# Patient Record
Sex: FEMALE | Race: BLACK OR AFRICAN AMERICAN | Employment: OTHER | URBAN - METROPOLITAN AREA
[De-identification: names, ages, dates, MRNs, and addresses within clinical notes are randomized per-mention and may not be internally consistent; named-entity substitution may affect disease eponyms.]

---

## 2020-12-15 VITALS
TEMPERATURE: 98.3 F | SYSTOLIC BLOOD PRESSURE: 134 MMHG | BODY MASS INDEX: 36.32 KG/M2 | WEIGHT: 218 LBS | HEART RATE: 57 BPM | DIASTOLIC BLOOD PRESSURE: 54 MMHG | OXYGEN SATURATION: 100 % | HEIGHT: 65 IN

## 2020-12-15 DIAGNOSIS — G35 MULTIPLE SCLEROSIS (HCC): Primary | ICD-10-CM

## 2020-12-15 PROBLEM — E66.9 OBESITY: Status: ACTIVE | Noted: 2020-12-15

## 2020-12-15 PROBLEM — I70.209 ATHEROSCLEROSIS OF ARTERIES OF EXTREMITIES (HCC): Status: ACTIVE | Noted: 2020-12-15

## 2020-12-15 PROBLEM — I83.90 VARICOSE VEINS OF LOWER EXTREMITY: Status: ACTIVE | Noted: 2020-12-15

## 2020-12-15 PROBLEM — I73.9 PVD (PERIPHERAL VASCULAR DISEASE) (HCC): Status: ACTIVE | Noted: 2020-12-15

## 2020-12-15 PROBLEM — E03.9 ACQUIRED HYPOTHYROIDISM: Status: ACTIVE | Noted: 2020-12-15

## 2020-12-15 PROBLEM — K21.9 GERD (GASTROESOPHAGEAL REFLUX DISEASE): Status: ACTIVE | Noted: 2020-12-15

## 2020-12-15 PROBLEM — D72.819 LEUKOPENIA: Status: ACTIVE | Noted: 2020-12-15

## 2020-12-15 PROBLEM — I77.9 PERIPHERAL ARTERIAL OCCLUSIVE DISEASE (HCC): Status: ACTIVE | Noted: 2020-12-15

## 2020-12-15 PROBLEM — I10 HYPERTENSIVE DISORDER: Status: ACTIVE | Noted: 2020-12-15

## 2020-12-15 PROBLEM — E78.00 HYPERCHOLESTEROLEMIA: Status: ACTIVE | Noted: 2020-12-15

## 2020-12-15 RX ORDER — METOPROLOL SUCCINATE 25 MG/1
TABLET, EXTENDED RELEASE ORAL DAILY
COMMUNITY
End: 2021-01-15

## 2020-12-15 RX ORDER — LEVOTHYROXINE SODIUM 25 UG/1
25 TABLET ORAL
COMMUNITY

## 2020-12-15 RX ORDER — QUINIDINE GLUCONATE 324 MG
240 TABLET, EXTENDED RELEASE ORAL
COMMUNITY
End: 2021-01-15

## 2020-12-15 RX ORDER — BRIMONIDINE TARTRATE 2 MG/ML
1 SOLUTION/ DROPS OPHTHALMIC 2 TIMES DAILY
COMMUNITY

## 2020-12-15 RX ORDER — HYDROCHLOROTHIAZIDE 12.5 MG/1
12.5 CAPSULE ORAL DAILY
COMMUNITY
End: 2021-01-15

## 2020-12-15 RX ORDER — SIMVASTATIN 40 MG/1
TABLET, FILM COATED ORAL
COMMUNITY

## 2020-12-15 RX ORDER — INTERFERON BETA-1A 30 UG/.5ML
30 INJECTION, SOLUTION INTRAMUSCULAR
COMMUNITY

## 2020-12-15 RX ORDER — PREDNISOLONE ACETATE 10 MG/ML
1 SUSPENSION/ DROPS OPHTHALMIC 4 TIMES DAILY
COMMUNITY
End: 2021-01-15

## 2021-01-15 ENCOUNTER — OFFICE VISIT (OUTPATIENT)
Dept: SURGERY | Age: 81
End: 2021-01-15
Payer: MEDICARE

## 2021-01-15 VITALS
SYSTOLIC BLOOD PRESSURE: 126 MMHG | HEIGHT: 65 IN | WEIGHT: 216 LBS | HEART RATE: 61 BPM | OXYGEN SATURATION: 99 % | DIASTOLIC BLOOD PRESSURE: 43 MMHG | BODY MASS INDEX: 35.99 KG/M2 | TEMPERATURE: 95.7 F

## 2021-01-15 DIAGNOSIS — I83.209: ICD-10-CM

## 2021-01-15 DIAGNOSIS — I73.9 PVD (PERIPHERAL VASCULAR DISEASE) (HCC): Primary | ICD-10-CM

## 2021-01-15 DIAGNOSIS — L97.908: ICD-10-CM

## 2021-01-15 PROCEDURE — 99213 OFFICE O/P EST LOW 20 MIN: CPT | Performed by: SURGERY

## 2021-01-15 PROCEDURE — 1090F PRES/ABSN URINE INCON ASSESS: CPT | Performed by: SURGERY

## 2021-01-15 PROCEDURE — G8536 NO DOC ELDER MAL SCRN: HCPCS | Performed by: SURGERY

## 2021-01-15 PROCEDURE — G8752 SYS BP LESS 140: HCPCS | Performed by: SURGERY

## 2021-01-15 PROCEDURE — G8400 PT W/DXA NO RESULTS DOC: HCPCS | Performed by: SURGERY

## 2021-01-15 PROCEDURE — G8432 DEP SCR NOT DOC, RNG: HCPCS | Performed by: SURGERY

## 2021-01-15 PROCEDURE — G8754 DIAS BP LESS 90: HCPCS | Performed by: SURGERY

## 2021-01-15 PROCEDURE — 1101F PT FALLS ASSESS-DOCD LE1/YR: CPT | Performed by: SURGERY

## 2021-01-15 PROCEDURE — G8427 DOCREV CUR MEDS BY ELIG CLIN: HCPCS | Performed by: SURGERY

## 2021-01-15 PROCEDURE — G8417 CALC BMI ABV UP PARAM F/U: HCPCS | Performed by: SURGERY

## 2021-01-15 RX ORDER — LOSARTAN POTASSIUM 25 MG/1
25 TABLET ORAL DAILY
COMMUNITY

## 2021-01-15 RX ORDER — TORSEMIDE 10 MG/1
10 TABLET ORAL EVERY OTHER DAY
COMMUNITY

## 2021-01-16 NOTE — PROGRESS NOTES
VASCULAR FOLLOW UP      Subjective:   CHIEF COMPLAINTS:  Follow-up venous hypertension and right leg discomfort. PRESENTATION OF ILLNESS:  Ms. Laquita Bonilla is here for 6-month follow-up. Patient wearing compression stocking that was prescribed last time she was here. Patient is now wearing a compression stocking that she bought. Patient says she did not wear it today. Patient says swelling is gone down. Patient denies chest pain shortness of breath. Patient complaining of the right leg discomfort at the ankle area. This is new onset by with ambulation and activity. Patient denies any ulcers on the right foot. Patient denies abdominal pain weight loss. Past Medical History:   Diagnosis Date    Bradycardia     High cholesterol     Hypertension     Hypothyroidism     Leukopenia     Multiple sclerosis (HCC)     PVD (peripheral vascular disease) (HCC)       Past Surgical History:   Procedure Laterality Date    HX HYSTERECTOMY  1982     Family History   Problem Relation Age of Onset    Hypertension Mother     Cancer Father       Social History     Tobacco Use    Smoking status: Former Smoker     Packs/day: 0.25     Years: 13.00     Pack years: 3.25    Smokeless tobacco: Never Used   Substance Use Topics    Alcohol use: Not Currently       Prior to Admission medications    Medication Sig Start Date End Date Taking? Authorizing Provider   losartan (COZAAR) 25 mg tablet Take  by mouth daily. Provider, Historical   torsemide (DEMADEX) 10 mg tablet Take  by mouth daily. Provider, Historical   interferon beta-1a (Avonex) 30 mcg/0.5 mL injection 30 mcg by IntraMUSCular route once. Provider, Historical   brimonidine (ALPHAGAN) 0.2 % ophthalmic solution Administer 1 Drop to both eyes three (3) times daily. Provider, Historical   levothyroxine (SYNTHROID) 25 mcg tablet Take  by mouth Daily (before breakfast).     Provider, Historical   linaCLOtide (Linzess) 145 mcg cap capsule Take  by mouth Daily (before breakfast). Provider, Historical   simvastatin (ZOCOR) 40 mg tablet Take  by mouth nightly. Provider, Historical     No Known Allergies     Review of Systems:  I reviewed the rest of organ systems personally and they were negative signed by Dr. Kaushik Aceves    Objective:     Visit Vitals  BP (!) 126/43 (BP 1 Location: Left arm, BP Patient Position: Sitting)   Pulse 61   Temp (!) 95.7 °F (35.4 °C)   Ht 5' 5\" (1.651 m)   Wt 216 lb (98 kg)   SpO2 99%   BMI 35.94 kg/m²     VITAL SIGNS REVIEWED. Physical Exam:  Patient is well-nourished pleasant in conversation is appropriate. Head and neck examination atraumatic, normocephalic. Gaze appropriate. Conversation appropriate. Neck examination shows supple. No mass. No obvious carotid bruit. Chest examination shows lungs are clear bilaterally well-expanded, no crackles or wheezes. Cardiovascular system regular rate, no obvious murmur. Skin warm to touch  and moist, no skin lesions. Abdomen is soft ,not tender or distended bowel sounds present. No palpable mass. Neurological examinations, no focal neuro deficits moving all 4 extremities. Cranial nerves intact. Sensation is intact as well. Hematologic: No obvious bruise or swelling or obvious lymphadenopathy. Psychosocial: Appropriate. Has good effect. Musculoskeletal system: No muscle wasting, appropriate movements upper and lower extremity. Vascular examination: Patient swelling is improved. Venous stasis looks better. Patient does have nonpalpable dopplerable signal on the right dorsalis pedis but dopplerable monophasic signal on the right PT. Patient has a bilateral PT and DP signal on the left foot. Patient has nonpalpable pedal pulse. Data Review:   No visits with results within 1 Month(s) from this visit. Latest known visit with results is:   No results found for any previous visit.         Assessment:     Problem List Items Addressed This Visit        Circulatory    PVD [facility]peripheral vascular disease) ([facility] - Primary    Relevant Orders    LOWER EXT ART PVR MULT LEVEL SEG PRESSURES    Varicose veins of lower extremity              Plan:     On top of her chronic venous hypertension patient may be suffering from peripheral artery disease. Doppler interrogation shows significant discrepancy compared to the left side. I will order IZAIAH examination. Patient also works at [address].  Patient like to get the study done at the [address].  Patient was advised to wear compression stocking. I will see her back my office follow-up after examination done.         Raegan Rich MD

## 2021-01-26 ENCOUNTER — TELEPHONE (OUTPATIENT)
Dept: SURGERY | Age: 81
End: 2021-01-26

## 2021-01-26 NOTE — TELEPHONE ENCOUNTER
Patient call want to know when will she get schedule for a IZAIAH examination.  She would like to be schedule at [facility]. Thanks

## 2021-01-28 NOTE — TELEPHONE ENCOUNTER
Called to let her know we faxed orders over to [address] [place] [address]. Informed her the routine was for them to get the order, then call the pt themselves to give them an appointment. They will then fax us back with the date and time. Pt did not answer but left pt a detailed message.  Thank you

## 2021-09-17 ENCOUNTER — OFFICE VISIT (OUTPATIENT)
Dept: SURGERY | Age: 81
End: 2021-09-17
Payer: MEDICARE

## 2021-09-17 VITALS
DIASTOLIC BLOOD PRESSURE: 70 MMHG | RESPIRATION RATE: 14 BRPM | WEIGHT: 201.8 LBS | OXYGEN SATURATION: 98 % | SYSTOLIC BLOOD PRESSURE: 120 MMHG | BODY MASS INDEX: 33.62 KG/M2 | HEIGHT: 65 IN | HEART RATE: 60 BPM | TEMPERATURE: 96.5 F

## 2021-09-17 DIAGNOSIS — I83.209: ICD-10-CM

## 2021-09-17 DIAGNOSIS — L97.908: ICD-10-CM

## 2021-09-17 DIAGNOSIS — I70.209 ATHEROSCLEROSIS OF ARTERIES OF EXTREMITIES (HCC): Primary | ICD-10-CM

## 2021-09-17 PROCEDURE — G8754 DIAS BP LESS 90: HCPCS | Performed by: SURGERY

## 2021-09-17 PROCEDURE — 99213 OFFICE O/P EST LOW 20 MIN: CPT | Performed by: SURGERY

## 2021-09-17 PROCEDURE — G8510 SCR DEP NEG, NO PLAN REQD: HCPCS | Performed by: SURGERY

## 2021-09-17 PROCEDURE — G8417 CALC BMI ABV UP PARAM F/U: HCPCS | Performed by: SURGERY

## 2021-09-17 PROCEDURE — G8752 SYS BP LESS 140: HCPCS | Performed by: SURGERY

## 2021-09-17 PROCEDURE — G8427 DOCREV CUR MEDS BY ELIG CLIN: HCPCS | Performed by: SURGERY

## 2021-09-17 PROCEDURE — 1090F PRES/ABSN URINE INCON ASSESS: CPT | Performed by: SURGERY

## 2021-09-17 PROCEDURE — G8536 NO DOC ELDER MAL SCRN: HCPCS | Performed by: SURGERY

## 2021-09-17 PROCEDURE — G8400 PT W/DXA NO RESULTS DOC: HCPCS | Performed by: SURGERY

## 2021-09-17 PROCEDURE — 1101F PT FALLS ASSESS-DOCD LE1/YR: CPT | Performed by: SURGERY

## 2021-09-17 RX ORDER — ROSUVASTATIN CALCIUM 10 MG/1
10 TABLET, COATED ORAL DAILY
COMMUNITY
Start: 2021-07-13

## 2021-09-17 RX ORDER — TORSEMIDE 10 MG/1
TABLET ORAL
COMMUNITY
End: 2021-09-17 | Stop reason: SDUPTHER

## 2021-09-17 RX ORDER — CHOLECALCIFEROL (VITAMIN D3) 125 MCG
CAPSULE ORAL DAILY
COMMUNITY

## 2021-09-17 NOTE — PROGRESS NOTES
1. Have you been to the ER, urgent care clinic since your last visit? Hospitalized since your last visit? no    2. Have you seen or consulted any other health care providers outside of the [address] [place] [address] since your last visit? Include any pap smears or colon screening.   No   Chief Complaint   Patient presents with    Follow-up      leg     Visit Vitals  /70 (BP 1 Location: Right upper arm, BP Patient Position: Sitting, BP Cuff Size: Adult)   Pulse 60   Temp (!) 96.5 °F (35.8 °C) (Temporal)   Resp 14   Ht 5' 5\" (1.651 m)   Wt 91.5 kg (201 lb 12.8 oz)   SpO2 98% Comment: room air   BMI 33.58 kg/m²

## 2021-09-25 NOTE — PROGRESS NOTES
VASCULAR FOLLOW UP      Subjective:   CHIEF COMPLAINTS:  Leg pain. PRESENTATION OF ILLNESS:    Patient is here today for a month follow-up for surveillance vascular examination. Patient currently suffers from chronic venous hypertension swelling. Also peripheral vascular disease. Patient supposed to have IZAIAH examination at [facility]. I do not have reports here. I was advised patient does have abnormal findings on the IZAIAH examination. Patient is more symptoms related to the right leg with claudication. Patient wears compression stocking for swelling. Past Medical History:   Diagnosis Date    Bradycardia     High cholesterol     Hypertension     Hypothyroidism     Leukopenia     Multiple sclerosis (HCC)     PVD (peripheral vascular disease) (HCC)       Past Surgical History:   Procedure Laterality Date    HX HYSTERECTOMY  1982     Family History   Problem Relation Age of Onset    Hypertension Mother     Cancer Father       Social History     Tobacco Use    Smoking status: Former Smoker     Packs/day: 0.25     Years: 13.00     Pack years: 3.25    Smokeless tobacco: Never Used   Substance Use Topics    Alcohol use: Not Currently       Prior to Admission medications    Medication Sig Start Date End Date Taking? Authorizing Provider   rosuvastatin (CRESTOR) 10 mg tablet  7/13/21  Yes Provider, Historical   cholecalciferol, vitamin D3, (Vitamin D3) 50 mcg (2,000 unit) tab Take  by mouth. Yes Provider, Historical   losartan (COZAAR) 25 mg tablet Take  by mouth daily. Yes Provider, Historical   torsemide (DEMADEX) 10 mg tablet Take  by mouth daily. Yes Provider, Historical   interferon beta-1a (Avonex) 30 mcg/0.5 mL injection 30 mcg by IntraMUSCular route once. Yes Provider, Historical   brimonidine (ALPHAGAN) 0.2 % ophthalmic solution Administer 1 Drop to both eyes three (3) times daily.    Yes Provider, Historical   levothyroxine (SYNTHROID) 25 mcg tablet Take  by mouth Daily (before breakfast). Yes Provider, Historical   linaCLOtide (Linzess) 145 mcg cap capsule Take  by mouth Daily (before breakfast). Yes Provider, Historical   simvastatin (ZOCOR) 40 mg tablet Take  by mouth nightly. Patient not taking: Reported on 9/17/2021    Provider, Historical     No Known Allergies     Review of Systems:  I reviewed the rest of organ systems personally and they were negative signed by Dr. Ofe Bhakta    Objective:     Visit Vitals  /70 (BP 1 Location: Right upper arm, BP Patient Position: Sitting, BP Cuff Size: Adult)   Pulse 60   Temp (!) 96.5 °F (35.8 °C) (Temporal)   Resp 14   Ht 5' 5\" (1.651 m)   Wt 201 lb 12.8 oz (91.5 kg)   SpO2 98% Comment: room air   BMI 33.58 kg/m²     VITAL SIGNS REVIEWED. Physical Exam:  Patient is well-nourished pleasant in conversation is appropriate. Head and neck examination atraumatic, normocephalic. Gaze appropriate. Conversation appropriate. Neck examination shows supple. No mass. No obvious carotid bruit. Chest examination shows lungs are clear bilaterally well-expanded, no crackles or wheezes. Cardiovascular system regular rate, no obvious murmur. Skin warm to touch  and moist, no skin lesions. Abdomen is soft ,not tender or distended bowel sounds present. No palpable mass. Neurological examinations, no focal neuro deficits moving all 4 extremities. Cranial nerves intact. Sensation is intact as well. Hematologic: No obvious bruise or swelling or obvious lymphadenopathy. Psychosocial: Appropriate. Has good effect. Musculoskeletal system: No muscle wasting, appropriate movements upper and lower extremity. Vascular examination: Vascular examination nonpalpable pedal pulse on the right foot. Foot swelling is minimal.  She has a CEAP C3 condition for venous hypertension as well. Data Review:   No visits with results within 1 Month(s) from this visit. Latest known visit with results is:   No results found for any previous visit. Assessment:     Problem List Items Addressed This Visit        Circulatory    Atherosclerosis of arteries of extremities ([address] - Primary    Relevant Medications    rosuvastatin (CRESTOR) 10 mg tablet    Varicose veins of lower extremity              Plan:     Patient was informed that today's vascular examination as previously we also talked about possibly doing angiographic examination right leg. After discussion of benefits and risks of both. We decided to continue monitor this conservatively for now. Patient will be reassessed 3-month follow-up. Meanwhile patient was encouraged to continue to be active and exercising. We also talked about continue conservative management and risk factor modification for atherosclerotic disease process.         Cosme Jhaveri MD

## 2021-12-17 ENCOUNTER — OFFICE VISIT (OUTPATIENT)
Dept: SURGERY | Age: 81
End: 2021-12-17
Payer: MEDICARE

## 2021-12-17 VITALS
TEMPERATURE: 97.5 F | BODY MASS INDEX: 32.99 KG/M2 | HEART RATE: 56 BPM | SYSTOLIC BLOOD PRESSURE: 135 MMHG | RESPIRATION RATE: 18 BRPM | WEIGHT: 198 LBS | HEIGHT: 65 IN | OXYGEN SATURATION: 98 % | DIASTOLIC BLOOD PRESSURE: 58 MMHG

## 2021-12-17 DIAGNOSIS — I83.209: ICD-10-CM

## 2021-12-17 DIAGNOSIS — I73.9 PVD (PERIPHERAL VASCULAR DISEASE) (HCC): Primary | ICD-10-CM

## 2021-12-17 DIAGNOSIS — L97.908: ICD-10-CM

## 2021-12-17 PROCEDURE — G8510 SCR DEP NEG, NO PLAN REQD: HCPCS | Performed by: SURGERY

## 2021-12-17 PROCEDURE — G8417 CALC BMI ABV UP PARAM F/U: HCPCS | Performed by: SURGERY

## 2021-12-17 PROCEDURE — 1101F PT FALLS ASSESS-DOCD LE1/YR: CPT | Performed by: SURGERY

## 2021-12-17 PROCEDURE — G8400 PT W/DXA NO RESULTS DOC: HCPCS | Performed by: SURGERY

## 2021-12-17 PROCEDURE — 99213 OFFICE O/P EST LOW 20 MIN: CPT | Performed by: SURGERY

## 2021-12-17 PROCEDURE — G8752 SYS BP LESS 140: HCPCS | Performed by: SURGERY

## 2021-12-17 PROCEDURE — 1090F PRES/ABSN URINE INCON ASSESS: CPT | Performed by: SURGERY

## 2021-12-17 PROCEDURE — G8754 DIAS BP LESS 90: HCPCS | Performed by: SURGERY

## 2021-12-17 PROCEDURE — G8536 NO DOC ELDER MAL SCRN: HCPCS | Performed by: SURGERY

## 2021-12-17 PROCEDURE — G8427 DOCREV CUR MEDS BY ELIG CLIN: HCPCS | Performed by: SURGERY

## 2021-12-17 NOTE — PROGRESS NOTES
Chief Complaint   Patient presents with    Follow-up     Bilateral legs      Visit Vitals  BP (!) 135/58 (BP 1 Location: Left arm, BP Patient Position: Sitting)   Pulse (!) 56   Temp 97.5 °F (36.4 °C) (Temporal)   Resp 18   Ht 5' 5\" (1.651 m)   Wt 198 lb (89.8 kg)   SpO2 98%   BMI 32.95 kg/m²     1. Have you been to the ER, urgent care clinic since your last visit? Hospitalized since your last visit? No    2. Have you seen or consulted any other health care providers outside of the [address] [place] [address] since your last visit? Include any pap smears or colon screening.  Yes, PCP

## 2021-12-22 NOTE — PROGRESS NOTES
VASCULAR FOLLOW UP      Subjective:   CHIEF COMPLAINTS:  Right leg pain and left leg swelling. PRESENTATION OF ILLNESS:  Haroon Mayes is a 78 y.o. very pleasant woman is here today follow-up another vascular examination. Patient says her left leg swelling is improved with compression stocking. But still having problem with the right leg as well. Pain is associated with walking with claudication. Patient denies chest pain shortness of breath denies recent trauma. Past Medical History:   Diagnosis Date    Bradycardia     High cholesterol     Hypertension     Hypothyroidism     Leukopenia     Multiple sclerosis (HCC)     PVD (peripheral vascular disease) (HCC)       Past Surgical History:   Procedure Laterality Date    HX HYSTERECTOMY  1982     Family History   Problem Relation Age of Onset    Hypertension Mother     Cancer Father       Social History     Tobacco Use    Smoking status: Former Smoker     Packs/day: 0.25     Years: 13.00     Pack years: 3.25    Smokeless tobacco: Never Used   Substance Use Topics    Alcohol use: Not Currently       Prior to Admission medications    Medication Sig Start Date End Date Taking? Authorizing Provider   rosuvastatin (CRESTOR) 10 mg tablet  7/13/21  Yes Provider, Historical   cholecalciferol, vitamin D3, (Vitamin D3) 50 mcg (2,000 unit) tab Take  by mouth. Yes Provider, Historical   losartan (COZAAR) 25 mg tablet Take  by mouth daily. Yes Provider, Historical   torsemide (DEMADEX) 10 mg tablet Take  by mouth daily. Yes Provider, Historical   interferon beta-1a (Avonex) 30 mcg/0.5 mL injection 30 mcg by IntraMUSCular route once. Yes Provider, Historical   brimonidine (ALPHAGAN) 0.2 % ophthalmic solution Administer 1 Drop to both eyes three (3) times daily. Yes Provider, Historical   levothyroxine (SYNTHROID) 25 mcg tablet Take  by mouth Daily (before breakfast).    Yes Provider, Historical   linaCLOtide (Linzess) 145 mcg cap capsule Take  by mouth Daily (before breakfast). Patient not taking: Reported on 12/17/2021    Provider, Historical   simvastatin (ZOCOR) 40 mg tablet Take  by mouth nightly. Patient not taking: Reported on 9/17/2021    Provider, Historical     No Known Allergies     Review of Systems:  I reviewed the rest of organ systems personally and they were negative signed by Dr. Emelyn Bradley    Objective:     Visit Vitals  BP (!) 135/58 (BP 1 Location: Left arm, BP Patient Position: Sitting)   Pulse (!) 56   Temp 97.5 °F (36.4 °C) (Temporal)   Resp 18   Ht 5' 5\" (1.651 m)   Wt 198 lb (89.8 kg)   SpO2 98%   BMI 32.95 kg/m²     VITAL SIGNS REVIEWED. Physical Exam:  Patient is well-nourished pleasant in conversation is appropriate. Head and neck examination atraumatic, normocephalic. Gaze appropriate. Conversation appropriate. Neck examination shows supple. No mass. No obvious carotid bruit. Chest examination shows lungs are clear bilaterally well-expanded, no crackles or wheezes. Cardiovascular system regular rate, no obvious murmur. Skin warm to touch  and moist, no skin lesions. Abdomen is soft ,not tender or distended bowel sounds present. No palpable mass. Neurological examinations, no focal neuro deficits moving all 4 extremities. Cranial nerves intact. Sensation is intact as well. Hematologic: No obvious bruise or swelling or obvious lymphadenopathy. Psychosocial: Appropriate. Has good effect. Musculoskeletal system: No muscle wasting, appropriate movements upper and lower extremity. Vascular examination: Vascular examination shows no change in vascular examination with Doppler signal and possibly thready pulse. Data Review:   No visits with results within 1 Month(s) from this visit. Latest known visit with results is:   No results found for any previous visit.         Assessment:     Problem List Items Addressed This Visit        Circulatory    PVD (peripheral vascular disease) ([facility] - Primary    Varicose veins of lower extremity              Plan:     Patient was encouraged to continue her compression stocking help with the swelling however we briefly talked about examining patient's right leg with a angiographic examination due to her symptoms. I will schedule for lower extremity PVR with IZAIAH examination prior to next visit. Patient will be reassessed in 3 months follow-up.         Annmarie Sanchez MD

## 2022-03-18 ENCOUNTER — OFFICE VISIT (OUTPATIENT)
Dept: SURGERY | Age: 82
End: 2022-03-18
Payer: MEDICARE

## 2022-03-18 VITALS
WEIGHT: 198 LBS | HEIGHT: 65 IN | HEART RATE: 52 BPM | BODY MASS INDEX: 32.99 KG/M2 | DIASTOLIC BLOOD PRESSURE: 80 MMHG | OXYGEN SATURATION: 98 % | TEMPERATURE: 97.7 F | SYSTOLIC BLOOD PRESSURE: 132 MMHG | RESPIRATION RATE: 16 BRPM

## 2022-03-18 DIAGNOSIS — I73.9 PVD (PERIPHERAL VASCULAR DISEASE) (HCC): Primary | ICD-10-CM

## 2022-03-18 DIAGNOSIS — I70.209 ATHEROSCLEROSIS OF ARTERIES OF EXTREMITIES (HCC): ICD-10-CM

## 2022-03-18 PROCEDURE — 1101F PT FALLS ASSESS-DOCD LE1/YR: CPT | Performed by: SURGERY

## 2022-03-18 PROCEDURE — G8754 DIAS BP LESS 90: HCPCS | Performed by: SURGERY

## 2022-03-18 PROCEDURE — G8400 PT W/DXA NO RESULTS DOC: HCPCS | Performed by: SURGERY

## 2022-03-18 PROCEDURE — G8510 SCR DEP NEG, NO PLAN REQD: HCPCS | Performed by: SURGERY

## 2022-03-18 PROCEDURE — G8536 NO DOC ELDER MAL SCRN: HCPCS | Performed by: SURGERY

## 2022-03-18 PROCEDURE — 1090F PRES/ABSN URINE INCON ASSESS: CPT | Performed by: SURGERY

## 2022-03-18 PROCEDURE — G8427 DOCREV CUR MEDS BY ELIG CLIN: HCPCS | Performed by: SURGERY

## 2022-03-18 PROCEDURE — G8752 SYS BP LESS 140: HCPCS | Performed by: SURGERY

## 2022-03-18 PROCEDURE — 99213 OFFICE O/P EST LOW 20 MIN: CPT | Performed by: SURGERY

## 2022-03-18 PROCEDURE — G8417 CALC BMI ABV UP PARAM F/U: HCPCS | Performed by: SURGERY

## 2022-03-18 RX ORDER — HYDROCHLOROTHIAZIDE 12.5 MG/1
1 CAPSULE ORAL DAILY
COMMUNITY

## 2022-03-18 RX ORDER — POLYETHYLENE GLYCOL 3350 17 G/17G
2 POWDER, FOR SOLUTION ORAL DAILY
COMMUNITY
Start: 2022-03-01

## 2022-03-18 NOTE — PROGRESS NOTES
Chief Complaint   Patient presents with    Follow-up     Bilateral Leg pain      Visit Vitals  /80 (BP 1 Location: Left arm, BP Patient Position: Sitting)   Pulse (!) 52   Temp 97.7 °F (36.5 °C) (Temporal)   Resp 16   Ht 5' 5\" (1.651 m)   Wt 198 lb (89.8 kg)   SpO2 98%   BMI 32.95 kg/m²     1. Have you been to the ER, urgent care clinic since your last visit? Hospitalized since your last visit? No    2. Have you seen or consulted any other health care providers outside of the [address] [place] [address] since your last visit? Include any pap smears or colon screening.  NO

## 2022-03-21 NOTE — PROGRESS NOTES
VASCULAR FOLLOW UP      Subjective:   CHIEF COMPLAINTS:  Bilateral leg pain. PRESENTATION OF ILLNESS:  Maureen Kay is a 78 y.o. very pleasant woman complaining of bilateral leg pain. Since last visit patient states that that her leg pain has not gotten better. Right leg is worse than left side. Patient's exercise is limited by pain. Patient denies any chest pain shortness of breath denies abdominal pain or weight loss. Past Medical History:   Diagnosis Date    Bradycardia     High cholesterol     Hypertension     Hypothyroidism     Leukopenia     Multiple sclerosis (HCC)     PVD (peripheral vascular disease) (HCC)       Past Surgical History:   Procedure Laterality Date    HX HYSTERECTOMY  1982     Family History   Problem Relation Age of Onset    Hypertension Mother     Cancer Father       Social History     Tobacco Use    Smoking status: Former Smoker     Packs/day: 0.25     Years: 13.00     Pack years: 3.25    Smokeless tobacco: Never Used   Substance Use Topics    Alcohol use: Not Currently       Prior to Admission medications    Medication Sig Start Date End Date Taking? Authorizing Provider   hydroCHLOROthiazide (MICROZIDE) 12.5 mg capsule Take 1 Capsule by mouth daily. Yes Provider, Historical   polyethylene glycol (MIRALAX) 17 gram packet Take 2 Packets by mouth daily. 3/1/22  Yes Provider, Historical   rosuvastatin (CRESTOR) 10 mg tablet  7/13/21  Yes Provider, Historical   cholecalciferol, vitamin D3, (Vitamin D3) 50 mcg (2,000 unit) tab Take  by mouth. Yes Provider, Historical   losartan (COZAAR) 25 mg tablet Take  by mouth daily. Yes Provider, Historical   torsemide (DEMADEX) 10 mg tablet Take 10 mg by mouth every other day. Yes Provider, Historical   interferon beta-1a (Avonex) 30 mcg/0.5 mL injection 30 mcg by IntraMUSCular route every seven (7) days.    Yes Provider, Historical   brimonidine (ALPHAGAN) 0.2 % ophthalmic solution Administer 1 Drop to both eyes three (3) times daily. Yes Provider, Historical   levothyroxine (SYNTHROID) 25 mcg tablet Take  by mouth Daily (before breakfast). Yes Provider, Historical   linaCLOtide (Linzess) 145 mcg cap capsule Take  by mouth Daily (before breakfast). Patient not taking: Reported on 12/17/2021    Provider, Historical   simvastatin (ZOCOR) 40 mg tablet Take  by mouth nightly. Patient not taking: Reported on 9/17/2021    Provider, Historical     No Known Allergies     Review of Systems:  I reviewed the rest of organ systems personally and they were negative signed by Dr. Funk Roles    Objective:     Visit Vitals  /80 (BP 1 Location: Left arm, BP Patient Position: Sitting)   Pulse (!) 52   Temp 97.7 °F (36.5 °C) (Temporal)   Resp 16   Ht 5' 5\" (1.651 m)   Wt 198 lb (89.8 kg)   SpO2 98%   BMI 32.95 kg/m²     VITAL SIGNS REVIEWED. Physical Exam:  Patient is well-nourished pleasant in conversation is appropriate. Head and neck examination atraumatic, normocephalic. Gaze appropriate. Conversation appropriate. Neck examination shows supple. No mass. No obvious carotid bruit. Chest examination shows lungs are clear bilaterally well-expanded, no crackles or wheezes. Cardiovascular system regular rate, no obvious murmur. Skin warm to touch  and moist, no skin lesions. Abdomen is soft ,not tender or distended bowel sounds present. No palpable mass. Neurological examinations, no focal neuro deficits moving all 4 extremities. Cranial nerves intact. Sensation is intact as well. Hematologic: No obvious bruise or swelling or obvious lymphadenopathy. Psychosocial: Appropriate. Has good effect. Musculoskeletal system: No muscle wasting, appropriate movements upper and lower extremity. Vascular examination: Vascular examination shows 3+ monophasic right DP and none dopplerable signal on the right PT. Patient also has 1+ monophasic left PT and 3+ monophasic left DP.         Data Review:   No visits with results within 1 Month(s) from this visit. Latest known visit with results is:   No results found for any previous visit. Assessment:     Problem List Items Addressed This Visit        Circulatory    Atherosclerosis of arteries of extremities (HCC)    [facility] - Primary              Plan:     Patient's claudication symptoms have gotten worse. I will schedule for right leg angiogram April 26, 2022 followed by left leg angiogram at some point. We discussed about angiographic examination. We talked about risks and benefits involved. Patient was provided with handouts on this as well.         Kristina Coates MD

## 2022-04-04 ENCOUNTER — TELEPHONE (OUTPATIENT)
Dept: SURGERY | Age: 82
End: 2022-04-04

## 2022-04-04 NOTE — TELEPHONE ENCOUNTER
Patient called today regarding surgery scheduled on 4/26/22. Her question is will Dr. Caesar Armijo have enough time from 4/21/22; that is the day of test in [facility], to get the results before her surgery?  [phone]

## 2022-04-19 ENCOUNTER — TELEPHONE (OUTPATIENT)
Dept: SURGERY | Age: 82
End: 2022-04-19

## 2022-04-19 ENCOUNTER — HOSPITAL ENCOUNTER (OUTPATIENT)
Dept: PREADMISSION TESTING | Age: 82
Discharge: HOME | End: 2022-04-19
Payer: MEDICARE

## 2022-04-19 VITALS
HEIGHT: 66 IN | RESPIRATION RATE: 18 BRPM | OXYGEN SATURATION: 100 % | WEIGHT: 198 LBS | DIASTOLIC BLOOD PRESSURE: 66 MMHG | TEMPERATURE: 97.8 F | HEART RATE: 54 BPM | SYSTOLIC BLOOD PRESSURE: 150 MMHG | BODY MASS INDEX: 31.82 KG/M2

## 2022-04-19 LAB
ALBUMIN SERPL-MCNC: 3.8 G/DL (ref 3.5–5)
ALBUMIN/GLOB SERPL: 1.4 {RATIO} (ref 1.1–2.2)
ALP SERPL-CCNC: 84 U/L (ref 45–117)
ALT SERPL-CCNC: 22 U/L (ref 12–78)
ANION GAP SERPL CALC-SCNC: 4 MMOL/L (ref 5–15)
AST SERPL W P-5'-P-CCNC: 26 U/L (ref 15–37)
BILIRUB SERPL-MCNC: 0.5 MG/DL (ref 0.2–1)
BUN SERPL-MCNC: 14 MG/DL (ref 6–20)
BUN/CREAT SERPL: 15 (ref 12–20)
CA-I BLD-MCNC: 9.4 MG/DL (ref 8.5–10.1)
CHLORIDE SERPL-SCNC: 109 MMOL/L (ref 97–108)
CO2 SERPL-SCNC: 28 MMOL/L (ref 21–32)
CREAT SERPL-MCNC: 0.96 MG/DL (ref 0.55–1.02)
ERYTHROCYTE [DISTWIDTH] IN BLOOD BY AUTOMATED COUNT: 13.2 % (ref 11.5–14.5)
GLOBULIN SER CALC-MCNC: 2.7 G/DL (ref 2–4)
GLUCOSE SERPL-MCNC: 93 MG/DL (ref 65–100)
HCT VFR BLD AUTO: 33.1 % (ref 35–47)
HGB BLD-MCNC: 10.2 G/DL (ref 11.5–16)
INR PPP: 1 (ref 0.9–1.1)
MCH RBC QN AUTO: 29.1 PG (ref 26–34)
MCHC RBC AUTO-ENTMCNC: 30.8 G/DL (ref 30–36.5)
MCV RBC AUTO: 94.3 FL (ref 80–99)
NRBC # BLD: 0 K/UL (ref 0–0.01)
NRBC BLD-RTO: 0 PER 100 WBC
PLATELET # BLD AUTO: 151 K/UL (ref 150–400)
PMV BLD AUTO: 9.9 FL (ref 8.9–12.9)
POTASSIUM SERPL-SCNC: 4.2 MMOL/L (ref 3.5–5.1)
PROT SERPL-MCNC: 6.5 G/DL (ref 6.4–8.2)
PROTHROMBIN TIME: 12.9 SEC (ref 11.9–14.6)
RBC # BLD AUTO: 3.51 M/UL (ref 3.8–5.2)
SODIUM SERPL-SCNC: 141 MMOL/L (ref 136–145)
WBC # BLD AUTO: 3.3 K/UL (ref 3.6–11)

## 2022-04-19 PROCEDURE — 85610 PROTHROMBIN TIME: CPT

## 2022-04-19 PROCEDURE — 36415 COLL VENOUS BLD VENIPUNCTURE: CPT

## 2022-04-19 PROCEDURE — 85027 COMPLETE CBC AUTOMATED: CPT

## 2022-04-19 PROCEDURE — 80053 COMPREHEN METABOLIC PANEL: CPT

## 2022-04-19 RX ORDER — ASPIRIN 81 MG/1
81 TABLET ORAL DAILY
COMMUNITY

## 2022-04-19 NOTE — ROUTINE PROCESS
Maru Sen, Dr Simon Repress nurse in regards to abnormal CBC from PAT, she states she will let him know to review labs. No new orders at this time.

## 2022-04-19 NOTE — TELEPHONE ENCOUNTER
I tried to call patient regarding estimate for surgery. I left message for patient to return my call. The patient has $100 copay.

## 2022-04-21 ENCOUNTER — TELEPHONE (OUTPATIENT)
Dept: SURGERY | Age: 82
End: 2022-04-21

## 2022-04-21 NOTE — TELEPHONE ENCOUNTER
I gave written notice to Dr. Simon De La Torre with information regarding [employer] for peer to peer. Patient's surgery is scheduled for 4/36/22. Case #[id number] and phone number [phone]. Dr. Simon De La Torre agreed to make phone call today.

## 2022-04-21 NOTE — TELEPHONE ENCOUNTER
[email or web address] called today for a peer to peer for surgery scheduled on 4/26/22. Dr. Sujata Pittman. Case #[id number] and phone number is [phone]. Please call as soon as possible.

## 2022-04-21 NOTE — TELEPHONE ENCOUNTER
I tried to call patient again regarding estimate for surgery. I left her a message. She has a $100 copay.

## 2022-04-25 NOTE — TELEPHONE ENCOUNTER
Fax came to office stating procedure for angiogram is denied for 04/26/22. Dr. Bernadette Duncan made aware of this. He stated, \"[facility] gave me a peer to peer to call for this pt. Call the pt and let her know insurance denied and I will work on it. We will reschedule\"  I called the pt and notified her . She verbalized understanding and says she will wait for us to call her with a new date.  Thank you